# Patient Record
Sex: FEMALE | Race: WHITE | Employment: FULL TIME | URBAN - METROPOLITAN AREA
[De-identification: names, ages, dates, MRNs, and addresses within clinical notes are randomized per-mention and may not be internally consistent; named-entity substitution may affect disease eponyms.]

---

## 2017-01-27 LAB
ABO + RH BLD: NORMAL
ABO + RH BLD: NORMAL
BLD GP AB SCN SERPL QL: NORMAL
C TRACH DNA SPEC QL PROBE+SIG AMP: NORMAL
GROUP B STREP PCR: NORMAL
HBV SURFACE AG SERPL QL IA: NORMAL
HIV 1+2 AB+HIV1 P24 AG SERPL QL IA: NORMAL
N GONORRHOEA DNA SPEC QL PROBE+SIG AMP: NORMAL
RUBELLA ANTIBODY IGG QUANTITATIVE: NORMAL IU/ML
T PALLIDUM IGG SER QL: NORMAL

## 2017-08-17 LAB — GROUP B STREP PCR: NORMAL

## 2017-08-30 ENCOUNTER — HOSPITAL ENCOUNTER (INPATIENT)
Facility: CLINIC | Age: 32
LOS: 4 days | Discharge: HOME | End: 2017-09-03
Attending: OBSTETRICS & GYNECOLOGY | Admitting: OBSTETRICS & GYNECOLOGY
Payer: COMMERCIAL

## 2017-08-30 ENCOUNTER — APPOINTMENT (OUTPATIENT)
Dept: ULTRASOUND IMAGING | Facility: CLINIC | Age: 32
End: 2017-08-30
Attending: OBSTETRICS & GYNECOLOGY
Payer: COMMERCIAL

## 2017-08-30 DIAGNOSIS — Z98.891 S/P PRIMARY LOW TRANSVERSE C-SECTION: Primary | ICD-10-CM

## 2017-08-30 PROBLEM — Z34.90 PREGNANCY: Status: ACTIVE | Noted: 2017-08-30

## 2017-08-30 LAB
ABO + RH BLD: NORMAL
ABO + RH BLD: NORMAL
AMPHETAMINES UR QL SCN: NEGATIVE
BASOPHILS # BLD AUTO: 0 10E9/L (ref 0–0.2)
BASOPHILS NFR BLD AUTO: 0.3 %
BLOOD BANK CMNT PATIENT-IMP: NORMAL
BLOOD BANK CMNT PATIENT-IMP: NORMAL
CANNABINOIDS UR QL: NEGATIVE
COCAINE UR QL: NEGATIVE
DATE RH IMM GL GVN: NORMAL
DIFFERENTIAL METHOD BLD: ABNORMAL
EOSINOPHIL # BLD AUTO: 0.1 10E9/L (ref 0–0.7)
EOSINOPHIL NFR BLD AUTO: 1.2 %
ERYTHROCYTE [DISTWIDTH] IN BLOOD BY AUTOMATED COUNT: 13.3 % (ref 10–15)
FETAL CELL SCN BLD QL ROSETTE: NORMAL
FIBRINOGEN PPP-MCNC: 690 MG/DL (ref 200–420)
HCT VFR BLD AUTO: 37.8 % (ref 35–47)
HGB BLD-MCNC: 13.2 G/DL (ref 11.7–15.7)
HGB F BLD QL KLEIH BETKE: NORMAL
HGB F MFR BLD KLEIH BETKE: NORMAL %
IMM GRANULOCYTES # BLD: 0 10E9/L (ref 0–0.4)
IMM GRANULOCYTES NFR BLD: 0.3 %
LYMPHOCYTES # BLD AUTO: 2.3 10E9/L (ref 0.8–5.3)
LYMPHOCYTES NFR BLD AUTO: 19.3 %
MCH RBC QN AUTO: 32.1 PG (ref 26.5–33)
MCHC RBC AUTO-ENTMCNC: 34.9 G/DL (ref 31.5–36.5)
MCV RBC AUTO: 92 FL (ref 78–100)
MONOCYTES # BLD AUTO: 0.8 10E9/L (ref 0–1.3)
MONOCYTES NFR BLD AUTO: 6.5 %
NEUTROPHILS # BLD AUTO: 8.7 10E9/L (ref 1.6–8.3)
NEUTROPHILS NFR BLD AUTO: 72.4 %
NRBC # BLD AUTO: 0 10*3/UL
NRBC BLD AUTO-RTO: 0 /100
OPIATES UR QL SCN: NEGATIVE
PCP UR QL SCN: NEGATIVE
PLATELET # BLD AUTO: 239 10E9/L (ref 150–450)
RBC # BLD AUTO: 4.11 10E12/L (ref 3.8–5.2)
RH IG VIALS RECOM PATIENT: NORMAL
WBC # BLD AUTO: 12 10E9/L (ref 4–11)

## 2017-08-30 PROCEDURE — 3E033VJ INTRODUCTION OF OTHER HORMONE INTO PERIPHERAL VEIN, PERCUTANEOUS APPROACH: ICD-10-PCS | Performed by: OBSTETRICS & GYNECOLOGY

## 2017-08-30 PROCEDURE — 85461 HEMOGLOBIN FETAL: CPT | Performed by: OBSTETRICS & GYNECOLOGY

## 2017-08-30 PROCEDURE — 86901 BLOOD TYPING SEROLOGIC RH(D): CPT | Performed by: OBSTETRICS & GYNECOLOGY

## 2017-08-30 PROCEDURE — 12000029 ZZH R&B OB INTERMEDIATE

## 2017-08-30 PROCEDURE — 76815 OB US LIMITED FETUS(S): CPT

## 2017-08-30 PROCEDURE — 86900 BLOOD TYPING SEROLOGIC ABO: CPT | Performed by: OBSTETRICS & GYNECOLOGY

## 2017-08-30 PROCEDURE — 25000125 ZZHC RX 250: Performed by: OBSTETRICS & GYNECOLOGY

## 2017-08-30 PROCEDURE — 85384 FIBRINOGEN ACTIVITY: CPT | Performed by: OBSTETRICS & GYNECOLOGY

## 2017-08-30 PROCEDURE — 86850 RBC ANTIBODY SCREEN: CPT | Performed by: OBSTETRICS & GYNECOLOGY

## 2017-08-30 PROCEDURE — 80307 DRUG TEST PRSMV CHEM ANLYZR: CPT | Performed by: OBSTETRICS & GYNECOLOGY

## 2017-08-30 PROCEDURE — 86870 RBC ANTIBODY IDENTIFICATION: CPT | Performed by: OBSTETRICS & GYNECOLOGY

## 2017-08-30 PROCEDURE — 86923 COMPATIBILITY TEST ELECTRIC: CPT | Performed by: OBSTETRICS & GYNECOLOGY

## 2017-08-30 PROCEDURE — 36415 COLL VENOUS BLD VENIPUNCTURE: CPT | Performed by: OBSTETRICS & GYNECOLOGY

## 2017-08-30 PROCEDURE — 59025 FETAL NON-STRESS TEST: CPT

## 2017-08-30 PROCEDURE — 99215 OFFICE O/P EST HI 40 MIN: CPT | Mod: 25

## 2017-08-30 PROCEDURE — 85025 COMPLETE CBC W/AUTO DIFF WBC: CPT | Performed by: OBSTETRICS & GYNECOLOGY

## 2017-08-30 PROCEDURE — 86780 TREPONEMA PALLIDUM: CPT | Performed by: OBSTETRICS & GYNECOLOGY

## 2017-08-30 PROCEDURE — 96372 THER/PROPH/DIAG INJ SC/IM: CPT

## 2017-08-30 PROCEDURE — 85460 HEMOGLOBIN FETAL: CPT | Performed by: OBSTETRICS & GYNECOLOGY

## 2017-08-30 PROCEDURE — 25000128 H RX IP 250 OP 636: Performed by: OBSTETRICS & GYNECOLOGY

## 2017-08-30 RX ORDER — SODIUM CHLORIDE, SODIUM LACTATE, POTASSIUM CHLORIDE, CALCIUM CHLORIDE 600; 310; 30; 20 MG/100ML; MG/100ML; MG/100ML; MG/100ML
INJECTION, SOLUTION INTRAVENOUS CONTINUOUS
Status: DISCONTINUED | OUTPATIENT
Start: 2017-08-30 | End: 2017-08-31

## 2017-08-30 RX ORDER — ACETAMINOPHEN 325 MG/1
650 TABLET ORAL EVERY 4 HOURS PRN
Status: DISCONTINUED | OUTPATIENT
Start: 2017-08-30 | End: 2017-08-31

## 2017-08-30 RX ORDER — METHYLERGONOVINE MALEATE 0.2 MG/ML
200 INJECTION INTRAVENOUS
Status: DISCONTINUED | OUTPATIENT
Start: 2017-08-30 | End: 2017-08-31

## 2017-08-30 RX ORDER — IBUPROFEN 400 MG/1
800 TABLET, FILM COATED ORAL
Status: DISCONTINUED | OUTPATIENT
Start: 2017-08-30 | End: 2017-08-31

## 2017-08-30 RX ORDER — PRENATAL VIT/IRON FUM/FOLIC AC 27MG-0.8MG
1 TABLET ORAL DAILY
COMMUNITY

## 2017-08-30 RX ORDER — OXYTOCIN 10 [USP'U]/ML
10 INJECTION, SOLUTION INTRAMUSCULAR; INTRAVENOUS
Status: DISCONTINUED | OUTPATIENT
Start: 2017-08-30 | End: 2017-08-31

## 2017-08-30 RX ORDER — OXYTOCIN/0.9 % SODIUM CHLORIDE 30/500 ML
1-24 PLASTIC BAG, INJECTION (ML) INTRAVENOUS CONTINUOUS
Status: DISCONTINUED | OUTPATIENT
Start: 2017-08-30 | End: 2017-08-31

## 2017-08-30 RX ORDER — ONDANSETRON 2 MG/ML
4 INJECTION INTRAMUSCULAR; INTRAVENOUS EVERY 6 HOURS PRN
Status: DISCONTINUED | OUTPATIENT
Start: 2017-08-30 | End: 2017-08-31

## 2017-08-30 RX ORDER — LIDOCAINE 40 MG/G
CREAM TOPICAL
Status: DISCONTINUED | OUTPATIENT
Start: 2017-08-30 | End: 2017-08-31

## 2017-08-30 RX ORDER — NALOXONE HYDROCHLORIDE 0.4 MG/ML
.1-.4 INJECTION, SOLUTION INTRAMUSCULAR; INTRAVENOUS; SUBCUTANEOUS
Status: DISCONTINUED | OUTPATIENT
Start: 2017-08-30 | End: 2017-08-31

## 2017-08-30 RX ORDER — OXYCODONE AND ACETAMINOPHEN 5; 325 MG/1; MG/1
1 TABLET ORAL
Status: DISCONTINUED | OUTPATIENT
Start: 2017-08-30 | End: 2017-08-31

## 2017-08-30 RX ORDER — ONDANSETRON 2 MG/ML
4 INJECTION INTRAMUSCULAR; INTRAVENOUS EVERY 6 HOURS PRN
Status: DISCONTINUED | OUTPATIENT
Start: 2017-08-30 | End: 2017-08-30

## 2017-08-30 RX ORDER — OXYTOCIN/0.9 % SODIUM CHLORIDE 30/500 ML
100-340 PLASTIC BAG, INJECTION (ML) INTRAVENOUS CONTINUOUS PRN
Status: DISCONTINUED | OUTPATIENT
Start: 2017-08-30 | End: 2017-08-31

## 2017-08-30 RX ORDER — CARBOPROST TROMETHAMINE 250 UG/ML
250 INJECTION, SOLUTION INTRAMUSCULAR
Status: DISCONTINUED | OUTPATIENT
Start: 2017-08-30 | End: 2017-08-31

## 2017-08-30 RX ADMIN — SODIUM CHLORIDE, POTASSIUM CHLORIDE, SODIUM LACTATE AND CALCIUM CHLORIDE: 600; 310; 30; 20 INJECTION, SOLUTION INTRAVENOUS at 17:21

## 2017-08-30 RX ADMIN — HUMAN RHO(D) IMMUNE GLOBULIN 300 MCG: 300 INJECTION, SOLUTION INTRAMUSCULAR at 14:30

## 2017-08-30 RX ADMIN — OXYTOCIN-SODIUM CHLORIDE 0.9% IV SOLN 30 UNIT/500ML 3 MILLI-UNITS/MIN: 30-0.9/5 SOLUTION at 18:20

## 2017-08-30 RX ADMIN — OXYTOCIN-SODIUM CHLORIDE 0.9% IV SOLN 30 UNIT/500ML 2 MILLI-UNITS/MIN: 30-0.9/5 SOLUTION at 17:49

## 2017-08-30 RX ADMIN — OXYTOCIN-SODIUM CHLORIDE 0.9% IV SOLN 30 UNIT/500ML 4 MILLI-UNITS/MIN: 30-0.9/5 SOLUTION at 18:49

## 2017-08-30 NOTE — H&P
LABOR PROGRESS NOTE  Vitals were reviewed  Has bleeding. Ctx present    Cervical Exam /-2 bleeding EFW 7 1/2#  Intact-not ruptured  Uterine activity:frequency q 3 minutes  Fetal Status:Tier 1 (normal)  Labs- no evidence for anemia or abruption  U/S - wnl  Impression: Bleeding possible sinus bleed. Smoker at risk for abruption. Labs and u/s wnl  Plan: Anticipate   Labor induction with Pitocin

## 2017-08-30 NOTE — IP AVS SNAPSHOT
[address]    [place] [address]    Phone:  [phone]                                       After Visit Summary   8/30/2017    Marycruz Aj    MRN: [id number]           After Visit Summary Signature Page     I have received my discharge instructions, and my questions have been answered. I have discussed any challenges I see with this plan with the nurse or doctor.    ..........................................................................................................................................  Patient/Patient Representative Signature      ..........................................................................................................................................  Patient Representative Print Name and Relationship to Patient    ..................................................               ................................................  Date                                            Time    ..........................................................................................................................................  Reviewed by Signature/Title    ...................................................              ..............................................  Date                                                            Time

## 2017-08-30 NOTE — PLAN OF CARE
Pt presents to [facility] via wheelchair per Dr Yanick OSCAR. Pt is 38 weeks gestation, having some vaginal bleeding since last night. Limited US to check placenta and JOSSELIN ordered, CBC w diff, fibrinogen ordered, (pt is Rh Negative) Annabelle Gonzalez. Pt is lali every 2-6 minutes, unaware. Pt states she feels painless abdominal tightening occassionally.  FHTs 140, reactive. Baby is visibly active, has hiccups currently.

## 2017-08-30 NOTE — H&P
The patient's history and physical have been reviewed, and the patient was examined. There has been  interval change in condition documented in the progress note    Edison Herndon

## 2017-08-30 NOTE — IP AVS SNAPSHOT
MRN:[id number]                      After Visit Summary   2017    Marycruz Aj    MRN: [id number]           Thank you!     Thank you for choosing [facility] for your care. Our goal is always to provide you with excellent care. Hearing back from our patients is one way we can continue to improve our services. Please take a few minutes to complete the written survey that you may receive in the mail after you visit with us. Thank you!        Patient Information     Date Of Birth          1985        About your hospital stay     You were admitted on:  2017 You last received care in the:  [address]    You were discharged on:  September 3, 2017       Who to Call     For medical emergencies, please call 911.  For non-urgent questions about your medical care, please call your primary care provider or clinic, None  For questions related to your surgery, please call your surgery clinic        Attending Provider     Provider Specialty    Edison Herndon MD OB/Gyn    Keely Haque MD OB/Gyn       Primary Care Provider    None      After Care Instructions     Activity       Review discharge instructions            Diet       Resume previous diet            Discharge Instructions - Gestational diabetic patients       Gestational diabetic patients to follow up for fasting blood sugar and 2 hour 75gm glucose load at 6 weeks postpartum.            Discharge Instructions - Postpartum visit       Schedule postpartum visit with your provider and return to clinic in 6 weeks.                  Further instructions from your care team       Postop  Birth Instructions    Activity       Do not lift more than 10 pounds for 6 weeks after surgery.  Ask family and friends for help when you need it.    No driving until you have stopped taking your pain medications (usually two weeks after surgery).    No heavy exercise or activity for 6 weeks.  Don't do anything  that will put a strain on your surgery site.    Don't strain when using the toilet.  Your care team may prescribe a stool softener if you have problems with your bowel movements.     To care for your incision:       Keep the incision clean and dry.    Do not soak your incision in water. No swimming or hot tubs until it has fully healed. You may soak in the bathtub if the water level is below your incision.    Do not use peroxide, gel, cream, lotion, or ointment on your incision.    Adjust your clothes to avoid pressure on your surgery site (check the elastic in your underwear for example).     You may see a small amount of clear or pink drainage and this is normal.  Check with your health care provider:       If the drainage increases or has an odor.    If the incision reddens, you have swelling, or develop a rash.    If you have increased pain and the medicine we prescribed doesn't help.    If you have a fever above 100.4 F (38 C) with or without chills when placing thermometer under your tongue.   The area around your incision (surgery wound), will feel numb.  This is normal. The numbness should go away in less than a year.     Keep your hands clean:  Always wash your hands before touching your incision (surgery wound). This helps reduce your risk of infection. If your hands aren't dirty, you may use an alcohol hand-rub to clean your hands. Keep your nails clean and short.    Call your healthcare provider if you have any of these symptoms:       You soak a sanitary pad with blood within 1 hour, or you see blood clots larger than a golf ball.    Bleeding that lasts more than 6 weeks.    Vaginal discharge that smells bad.    Severe pain, cramping or tenderness in your lower belly area.    A need to urinate more frequently (use the toilet more often), more urgently (use the toilet very quickly), or it burns when you urinate.    Nausea and vomiting.    Redness, swelling or pain around a vein in your leg.    Problems  "breastfeeding or a red or painful area on your breast.    Chest pain and cough or are gasping for air.    Problems with coping with sadness, anxiety or depression. If you have concerns about hurting yourself or the baby, call your provider immediately.      You have questions or concerns after you return home.                    Pending Results     Date and Time Order Name Status Description    2017 1612 Placenta path order and indications In process             Admission Information     Date & Time Provider Department Dept. Phone    2017 Keely Haque MD [address] [phone]      Your Vitals Were     Blood Pressure Pulse Temperature Respirations Height Weight    115/72 70 98.2  F (36.8  C) (Oral) 16 1.626 m (5' 4\") 88.9 kg (196 lb)    Pulse Oximetry BMI (Body Mass Index)                100% 33.64 kg/m2          [employer] Information     [employer] lets you send messages to your doctor, view your test results, renew your prescriptions, schedule appointments and more. To sign up, go to [email or web address] . Click on \"Log in\" on the left side of the screen, which will take you to the Welcome page. Then click on \"Sign up Now\" on the right side of the page.     You will be asked to enter the access code listed below, as well as some personal information. Please follow the directions to create your username and password.     Your access code is: [email or web address]  Expires: 2017 10:27 AM     Your access code will  in 90 days. If you need help or a new code, please call your [place] clinic or [phone].        Care EveryWhere ID     This is your Care EveryWhere ID. This could be used by other organizations to access your [place] medical records  [id number]        Equal Access to Services     ROBERT ARMSTRONG : Pj Holloway, jennifer zamarripa, german gallardo. So [facility] [phone].    ATENCIÓN: Si habla " español, tiene a aguilar disposición servicios gratuitos de asistencia lingüística. Jennifer hawkins [phone].    We comply with applicable federal civil rights laws and [place] laws. We do not discriminate on the basis of race, color, national origin, age, disability sex, sexual orientation or gender identity.               Review of your medicines      START taking        Dose / Directions    oxyCODONE 5 MG IR tablet   Commonly known as:  ROXICODONE        Dose:  5-10 mg   Take 1-2 tablets (5-10 mg) by mouth every 3 hours as needed for moderate to severe pain   Quantity:  30 tablet   Refills:  0         CONTINUE these medicines which have NOT CHANGED        Dose / Directions    prenatal multivitamin plus iron 27-0.8 MG Tabs per tablet   Indication:  Pregnancy        Dose:  1 tablet   Take 1 tablet by mouth daily   Refills:  0            Where to get your medicines      Some of these will need a paper prescription and others can be bought over the counter. Ask your nurse if you have questions.     Bring a paper prescription for each of these medications     oxyCODONE 5 MG IR tablet                Protect others around you: Learn how to safely use, store and throw away your medicines at www.disposemymeds.org.             Medication List: This is a list of all your medications and when to take them. Check marks below indicate your daily home schedule. Keep this list as a reference.      Medications           Morning Afternoon Evening Bedtime As Needed    oxyCODONE 5 MG IR tablet   Commonly known as:  ROXICODONE   Take 1-2 tablets (5-10 mg) by mouth every 3 hours as needed for moderate to severe pain   Last time this was given:  5 mg on 9/3/2017 10:07 AM   Next Dose Due:  1:07pm                                  prenatal multivitamin plus iron 27-0.8 MG Tabs per tablet   Take 1 tablet by mouth daily

## 2017-08-31 ENCOUNTER — ANESTHESIA EVENT (OUTPATIENT)
Dept: OBGYN | Facility: CLINIC | Age: 32
End: 2017-08-31
Payer: COMMERCIAL

## 2017-08-31 ENCOUNTER — ANESTHESIA (OUTPATIENT)
Dept: OBGYN | Facility: CLINIC | Age: 32
End: 2017-08-31
Payer: COMMERCIAL

## 2017-08-31 PROBLEM — Z98.891 S/P PRIMARY LOW TRANSVERSE C-SECTION: Status: ACTIVE | Noted: 2017-08-31

## 2017-08-31 LAB
ABO + RH BLD: ABNORMAL
ABO + RH BLD: ABNORMAL
ABO + RH BLD: NORMAL
ABO + RH BLD: NORMAL
APTT PPP: 29 SEC (ref 22–37)
BLD GP AB INVEST PLASRBC-IMP: ABNORMAL
BLD GP AB SCN SERPL QL: ABNORMAL
BLD PROD TYP BPU: ABNORMAL
BLD PROD TYP BPU: NORMAL
BLD PROD TYP BPU: NORMAL
BLD UNIT ID BPU: 0
BLD UNIT ID BPU: 0
BLOOD BANK CMNT PATIENT-IMP: ABNORMAL
BLOOD BANK CMNT PATIENT-IMP: NORMAL
BLOOD PRODUCT CODE: NORMAL
BLOOD PRODUCT CODE: NORMAL
BPU ID: NORMAL
BPU ID: NORMAL
ERYTHROCYTE [DISTWIDTH] IN BLOOD BY AUTOMATED COUNT: 13.1 % (ref 10–15)
HCT VFR BLD AUTO: 36.4 % (ref 35–47)
HGB BLD-MCNC: 12.9 G/DL (ref 11.7–15.7)
INR PPP: 0.94 (ref 0.86–1.14)
MCH RBC QN AUTO: 32.5 PG (ref 26.5–33)
MCHC RBC AUTO-ENTMCNC: 35.4 G/DL (ref 31.5–36.5)
MCV RBC AUTO: 92 FL (ref 78–100)
NUM BPU REQUESTED: 2
PLATELET # BLD AUTO: 220 10E9/L (ref 150–450)
RBC # BLD AUTO: 3.97 10E12/L (ref 3.8–5.2)
SPECIMEN EXP DATE BLD: ABNORMAL
SPECIMEN EXP DATE BLD: NORMAL
T PALLIDUM IGG+IGM SER QL: NEGATIVE
TRANSFUSION STATUS PATIENT QL: NORMAL
WBC # BLD AUTO: 13.8 10E9/L (ref 4–11)

## 2017-08-31 PROCEDURE — 85610 PROTHROMBIN TIME: CPT | Performed by: OBSTETRICS & GYNECOLOGY

## 2017-08-31 PROCEDURE — 37000011 ZZH ANESTHESIA WARD SERVICE

## 2017-08-31 PROCEDURE — 71000014 ZZH RECOVERY PHASE 1 LEVEL 2 FIRST HR: Performed by: OBSTETRICS & GYNECOLOGY

## 2017-08-31 PROCEDURE — 88307 TISSUE EXAM BY PATHOLOGIST: CPT | Performed by: OBSTETRICS & GYNECOLOGY

## 2017-08-31 PROCEDURE — 85730 THROMBOPLASTIN TIME PARTIAL: CPT | Performed by: OBSTETRICS & GYNECOLOGY

## 2017-08-31 PROCEDURE — 71000015 ZZH RECOVERY PHASE 1 LEVEL 2 EA ADDTL HR: Performed by: OBSTETRICS & GYNECOLOGY

## 2017-08-31 PROCEDURE — 25000125 ZZHC RX 250: Performed by: OBSTETRICS & GYNECOLOGY

## 2017-08-31 PROCEDURE — 25000128 H RX IP 250 OP 636: Performed by: OBSTETRICS & GYNECOLOGY

## 2017-08-31 PROCEDURE — 36415 COLL VENOUS BLD VENIPUNCTURE: CPT | Performed by: OBSTETRICS & GYNECOLOGY

## 2017-08-31 PROCEDURE — 00HU33Z INSERTION OF INFUSION DEVICE INTO SPINAL CANAL, PERCUTANEOUS APPROACH: ICD-10-PCS | Performed by: ANESTHESIOLOGY

## 2017-08-31 PROCEDURE — 27210794 ZZH OR GENERAL SUPPLY STERILE: Performed by: OBSTETRICS & GYNECOLOGY

## 2017-08-31 PROCEDURE — 25000128 H RX IP 250 OP 636: Performed by: NURSE ANESTHETIST, CERTIFIED REGISTERED

## 2017-08-31 PROCEDURE — 3E0R3CZ INTRODUCTION OF REGIONAL ANESTHETIC INTO SPINAL CANAL, PERCUTANEOUS APPROACH: ICD-10-PCS | Performed by: ANESTHESIOLOGY

## 2017-08-31 PROCEDURE — 88307 TISSUE EXAM BY PATHOLOGIST: CPT | Mod: 26 | Performed by: OBSTETRICS & GYNECOLOGY

## 2017-08-31 PROCEDURE — 25000125 ZZHC RX 250: Performed by: NURSE ANESTHETIST, CERTIFIED REGISTERED

## 2017-08-31 PROCEDURE — 25000128 H RX IP 250 OP 636: Performed by: ANESTHESIOLOGY

## 2017-08-31 PROCEDURE — P9016 RBC LEUKOCYTES REDUCED: HCPCS | Performed by: OBSTETRICS & GYNECOLOGY

## 2017-08-31 PROCEDURE — 85027 COMPLETE CBC AUTOMATED: CPT | Performed by: OBSTETRICS & GYNECOLOGY

## 2017-08-31 PROCEDURE — 25000132 ZZH RX MED GY IP 250 OP 250 PS 637: Performed by: OBSTETRICS & GYNECOLOGY

## 2017-08-31 PROCEDURE — 12000037 ZZH R&B POSTPARTUM INTERMEDIATE

## 2017-08-31 PROCEDURE — 37000008 ZZH ANESTHESIA TECHNICAL FEE, 1ST 30 MIN: Performed by: OBSTETRICS & GYNECOLOGY

## 2017-08-31 PROCEDURE — 37000009 ZZH ANESTHESIA TECHNICAL FEE, EACH ADDTL 15 MIN: Performed by: OBSTETRICS & GYNECOLOGY

## 2017-08-31 PROCEDURE — 10907ZC DRAINAGE OF AMNIOTIC FLUID, THERAPEUTIC FROM PRODUCTS OF CONCEPTION, VIA NATURAL OR ARTIFICIAL OPENING: ICD-10-PCS | Performed by: OBSTETRICS & GYNECOLOGY

## 2017-08-31 PROCEDURE — 36000056 ZZH SURGERY LEVEL 3 1ST 30 MIN: Performed by: OBSTETRICS & GYNECOLOGY

## 2017-08-31 PROCEDURE — 36000058 ZZH SURGERY LEVEL 3 EA 15 ADDTL MIN: Performed by: OBSTETRICS & GYNECOLOGY

## 2017-08-31 RX ORDER — OXYTOCIN/0.9 % SODIUM CHLORIDE 30/500 ML
340 PLASTIC BAG, INJECTION (ML) INTRAVENOUS CONTINUOUS PRN
Status: DISCONTINUED | OUTPATIENT
Start: 2017-08-31 | End: 2017-09-03 | Stop reason: HOSPADM

## 2017-08-31 RX ORDER — ACETAMINOPHEN 325 MG/1
650 TABLET ORAL EVERY 4 HOURS PRN
Status: DISCONTINUED | OUTPATIENT
Start: 2017-09-03 | End: 2017-09-03 | Stop reason: HOSPADM

## 2017-08-31 RX ORDER — ONDANSETRON 4 MG/1
4 TABLET, ORALLY DISINTEGRATING ORAL EVERY 30 MIN PRN
Status: DISCONTINUED | OUTPATIENT
Start: 2017-08-31 | End: 2017-08-31

## 2017-08-31 RX ORDER — ROPIVACAINE HYDROCHLORIDE 2 MG/ML
10 INJECTION, SOLUTION EPIDURAL; INFILTRATION; PERINEURAL ONCE
Status: COMPLETED | OUTPATIENT
Start: 2017-08-31 | End: 2017-08-31

## 2017-08-31 RX ORDER — ONDANSETRON 2 MG/ML
4 INJECTION INTRAMUSCULAR; INTRAVENOUS EVERY 30 MIN PRN
Status: DISCONTINUED | OUTPATIENT
Start: 2017-08-31 | End: 2017-08-31

## 2017-08-31 RX ORDER — DIPHENHYDRAMINE HCL 25 MG
25 CAPSULE ORAL EVERY 6 HOURS PRN
Status: DISCONTINUED | OUTPATIENT
Start: 2017-08-31 | End: 2017-09-03 | Stop reason: HOSPADM

## 2017-08-31 RX ORDER — FENTANYL CITRATE 50 UG/ML
25-50 INJECTION, SOLUTION INTRAMUSCULAR; INTRAVENOUS
Status: DISCONTINUED | OUTPATIENT
Start: 2017-08-31 | End: 2017-08-31

## 2017-08-31 RX ORDER — OXYCODONE HYDROCHLORIDE 5 MG/1
5-10 TABLET ORAL
Status: DISCONTINUED | OUTPATIENT
Start: 2017-08-31 | End: 2017-09-03 | Stop reason: HOSPADM

## 2017-08-31 RX ORDER — NALBUPHINE HYDROCHLORIDE 10 MG/ML
2.5-5 INJECTION, SOLUTION INTRAMUSCULAR; INTRAVENOUS; SUBCUTANEOUS EVERY 6 HOURS PRN
Status: DISCONTINUED | OUTPATIENT
Start: 2017-08-31 | End: 2017-08-31

## 2017-08-31 RX ORDER — HYDROCORTISONE 2.5 %
CREAM (GRAM) TOPICAL 3 TIMES DAILY PRN
Status: DISCONTINUED | OUTPATIENT
Start: 2017-08-31 | End: 2017-09-03 | Stop reason: HOSPADM

## 2017-08-31 RX ORDER — LIDOCAINE 40 MG/G
CREAM TOPICAL
Status: DISCONTINUED | OUTPATIENT
Start: 2017-08-31 | End: 2017-09-03 | Stop reason: HOSPADM

## 2017-08-31 RX ORDER — NALOXONE HYDROCHLORIDE 0.4 MG/ML
.1-.4 INJECTION, SOLUTION INTRAMUSCULAR; INTRAVENOUS; SUBCUTANEOUS
Status: ACTIVE | OUTPATIENT
Start: 2017-08-31 | End: 2017-09-01

## 2017-08-31 RX ORDER — FENTANYL CITRATE 50 UG/ML
100 INJECTION, SOLUTION INTRAMUSCULAR; INTRAVENOUS ONCE
Status: COMPLETED | OUTPATIENT
Start: 2017-08-31 | End: 2017-08-31

## 2017-08-31 RX ORDER — ALBUTEROL SULFATE 0.83 MG/ML
2.5 SOLUTION RESPIRATORY (INHALATION) EVERY 4 HOURS PRN
Status: DISCONTINUED | OUTPATIENT
Start: 2017-08-31 | End: 2017-08-31 | Stop reason: HOSPADM

## 2017-08-31 RX ORDER — KETOROLAC TROMETHAMINE 30 MG/ML
INJECTION, SOLUTION INTRAMUSCULAR; INTRAVENOUS
Status: DISCONTINUED
Start: 2017-08-31 | End: 2017-08-31 | Stop reason: HOSPADM

## 2017-08-31 RX ORDER — CITRIC ACID/SODIUM CITRATE 334-500MG
SOLUTION, ORAL ORAL
Status: DISCONTINUED
Start: 2017-08-31 | End: 2017-08-31 | Stop reason: HOSPADM

## 2017-08-31 RX ORDER — DIPHENHYDRAMINE HYDROCHLORIDE 50 MG/ML
25 INJECTION INTRAMUSCULAR; INTRAVENOUS EVERY 6 HOURS PRN
Status: DISCONTINUED | OUTPATIENT
Start: 2017-08-31 | End: 2017-09-03 | Stop reason: HOSPADM

## 2017-08-31 RX ORDER — ONDANSETRON 2 MG/ML
4 INJECTION INTRAMUSCULAR; INTRAVENOUS EVERY 6 HOURS PRN
Status: DISCONTINUED | OUTPATIENT
Start: 2017-08-31 | End: 2017-08-31

## 2017-08-31 RX ORDER — OXYTOCIN/0.9 % SODIUM CHLORIDE 30/500 ML
100 PLASTIC BAG, INJECTION (ML) INTRAVENOUS CONTINUOUS
Status: DISCONTINUED | OUTPATIENT
Start: 2017-08-31 | End: 2017-09-03 | Stop reason: HOSPADM

## 2017-08-31 RX ORDER — MEPERIDINE HYDROCHLORIDE 25 MG/ML
12.5 INJECTION INTRAMUSCULAR; INTRAVENOUS; SUBCUTANEOUS
Status: DISCONTINUED | OUTPATIENT
Start: 2017-08-31 | End: 2017-08-31

## 2017-08-31 RX ORDER — NALOXONE HYDROCHLORIDE 0.4 MG/ML
.1-.4 INJECTION, SOLUTION INTRAMUSCULAR; INTRAVENOUS; SUBCUTANEOUS
Status: DISCONTINUED | OUTPATIENT
Start: 2017-08-31 | End: 2017-09-03 | Stop reason: HOSPADM

## 2017-08-31 RX ORDER — DEXTROSE, SODIUM CHLORIDE, SODIUM LACTATE, POTASSIUM CHLORIDE, AND CALCIUM CHLORIDE 5; .6; .31; .03; .02 G/100ML; G/100ML; G/100ML; G/100ML; G/100ML
INJECTION, SOLUTION INTRAVENOUS CONTINUOUS
Status: DISCONTINUED | OUTPATIENT
Start: 2017-08-31 | End: 2017-09-03 | Stop reason: HOSPADM

## 2017-08-31 RX ORDER — CEFAZOLIN SODIUM 1 G/3ML
INJECTION, POWDER, FOR SOLUTION INTRAMUSCULAR; INTRAVENOUS PRN
Status: DISCONTINUED | OUTPATIENT
Start: 2017-08-31 | End: 2017-08-31

## 2017-08-31 RX ORDER — FENTANYL CITRATE 50 UG/ML
25-50 INJECTION, SOLUTION INTRAMUSCULAR; INTRAVENOUS
Status: DISCONTINUED | OUTPATIENT
Start: 2017-08-31 | End: 2017-08-31 | Stop reason: HOSPADM

## 2017-08-31 RX ORDER — ACETAMINOPHEN 325 MG/1
975 TABLET ORAL EVERY 8 HOURS
Status: DISCONTINUED | OUTPATIENT
Start: 2017-08-31 | End: 2017-09-03 | Stop reason: HOSPADM

## 2017-08-31 RX ORDER — BISACODYL 10 MG
10 SUPPOSITORY, RECTAL RECTAL DAILY PRN
Status: DISCONTINUED | OUTPATIENT
Start: 2017-09-02 | End: 2017-09-03 | Stop reason: HOSPADM

## 2017-08-31 RX ORDER — MORPHINE SULFATE 1 MG/ML
INJECTION, SOLUTION EPIDURAL; INTRATHECAL; INTRAVENOUS PRN
Status: DISCONTINUED | OUTPATIENT
Start: 2017-08-31 | End: 2017-08-31

## 2017-08-31 RX ORDER — NALOXONE HYDROCHLORIDE 0.4 MG/ML
.1-.4 INJECTION, SOLUTION INTRAMUSCULAR; INTRAVENOUS; SUBCUTANEOUS
Status: DISCONTINUED | OUTPATIENT
Start: 2017-08-31 | End: 2017-08-31

## 2017-08-31 RX ORDER — OXYTOCIN 10 [USP'U]/ML
10 INJECTION, SOLUTION INTRAMUSCULAR; INTRAVENOUS
Status: DISCONTINUED | OUTPATIENT
Start: 2017-08-31 | End: 2017-09-03 | Stop reason: HOSPADM

## 2017-08-31 RX ORDER — EPHEDRINE SULFATE 50 MG/ML
5 INJECTION, SOLUTION INTRAMUSCULAR; INTRAVENOUS; SUBCUTANEOUS
Status: DISCONTINUED | OUTPATIENT
Start: 2017-08-31 | End: 2017-08-31

## 2017-08-31 RX ORDER — HYDRALAZINE HYDROCHLORIDE 20 MG/ML
2.5-5 INJECTION INTRAMUSCULAR; INTRAVENOUS EVERY 10 MIN PRN
Status: DISCONTINUED | OUTPATIENT
Start: 2017-08-31 | End: 2017-08-31 | Stop reason: HOSPADM

## 2017-08-31 RX ORDER — ONDANSETRON 4 MG/1
4 TABLET, ORALLY DISINTEGRATING ORAL EVERY 6 HOURS PRN
Status: DISCONTINUED | OUTPATIENT
Start: 2017-08-31 | End: 2017-08-31

## 2017-08-31 RX ORDER — KETOROLAC TROMETHAMINE 30 MG/ML
30 INJECTION, SOLUTION INTRAMUSCULAR; INTRAVENOUS EVERY 6 HOURS
Status: COMPLETED | OUTPATIENT
Start: 2017-08-31 | End: 2017-09-01

## 2017-08-31 RX ORDER — MISOPROSTOL 200 UG/1
400 TABLET ORAL
Status: DISCONTINUED | OUTPATIENT
Start: 2017-08-31 | End: 2017-09-03 | Stop reason: HOSPADM

## 2017-08-31 RX ORDER — SIMETHICONE 80 MG
80 TABLET,CHEWABLE ORAL 4 TIMES DAILY PRN
Status: DISCONTINUED | OUTPATIENT
Start: 2017-08-31 | End: 2017-09-03 | Stop reason: HOSPADM

## 2017-08-31 RX ORDER — SODIUM CHLORIDE, SODIUM LACTATE, POTASSIUM CHLORIDE, CALCIUM CHLORIDE 600; 310; 30; 20 MG/100ML; MG/100ML; MG/100ML; MG/100ML
INJECTION, SOLUTION INTRAVENOUS CONTINUOUS PRN
Status: DISCONTINUED | OUTPATIENT
Start: 2017-08-31 | End: 2017-08-31

## 2017-08-31 RX ORDER — ONDANSETRON 2 MG/ML
INJECTION INTRAMUSCULAR; INTRAVENOUS PRN
Status: DISCONTINUED | OUTPATIENT
Start: 2017-08-31 | End: 2017-08-31

## 2017-08-31 RX ORDER — SODIUM CHLORIDE, SODIUM LACTATE, POTASSIUM CHLORIDE, CALCIUM CHLORIDE 600; 310; 30; 20 MG/100ML; MG/100ML; MG/100ML; MG/100ML
INJECTION, SOLUTION INTRAVENOUS CONTINUOUS
Status: DISCONTINUED | OUTPATIENT
Start: 2017-08-31 | End: 2017-09-03 | Stop reason: HOSPADM

## 2017-08-31 RX ORDER — AMOXICILLIN 250 MG
1-2 CAPSULE ORAL 2 TIMES DAILY
Status: DISCONTINUED | OUTPATIENT
Start: 2017-08-31 | End: 2017-09-03 | Stop reason: HOSPADM

## 2017-08-31 RX ORDER — ONDANSETRON 2 MG/ML
4 INJECTION INTRAMUSCULAR; INTRAVENOUS EVERY 6 HOURS PRN
Status: DISCONTINUED | OUTPATIENT
Start: 2017-08-31 | End: 2017-09-03 | Stop reason: HOSPADM

## 2017-08-31 RX ORDER — LANOLIN 100 %
OINTMENT (GRAM) TOPICAL
Status: DISCONTINUED | OUTPATIENT
Start: 2017-08-31 | End: 2017-09-03 | Stop reason: HOSPADM

## 2017-08-31 RX ORDER — LIDOCAINE HCL/EPINEPHRINE/PF 2%-1:200K
VIAL (ML) INJECTION
Status: DISCONTINUED
Start: 2017-08-31 | End: 2017-08-31 | Stop reason: HOSPADM

## 2017-08-31 RX ORDER — MORPHINE SULFATE 1 MG/ML
INJECTION, SOLUTION EPIDURAL; INTRATHECAL; INTRAVENOUS
Status: DISCONTINUED
Start: 2017-08-31 | End: 2017-08-31 | Stop reason: HOSPADM

## 2017-08-31 RX ORDER — HYDROMORPHONE HYDROCHLORIDE 1 MG/ML
.3-.5 INJECTION, SOLUTION INTRAMUSCULAR; INTRAVENOUS; SUBCUTANEOUS EVERY 10 MIN PRN
Status: DISCONTINUED | OUTPATIENT
Start: 2017-08-31 | End: 2017-08-31

## 2017-08-31 RX ADMIN — PHENYLEPHRINE HYDROCHLORIDE 100 MCG: 10 INJECTION, SOLUTION INTRAMUSCULAR; INTRAVENOUS; SUBCUTANEOUS at 15:22

## 2017-08-31 RX ADMIN — MORPHINE SULFATE 3 MG: 1 INJECTION EPIDURAL; INTRATHECAL; INTRAVENOUS at 15:27

## 2017-08-31 RX ADMIN — DIPHENHYDRAMINE HYDROCHLORIDE 25 MG: 25 CAPSULE ORAL at 19:18

## 2017-08-31 RX ADMIN — OXYTOCIN-SODIUM CHLORIDE 0.9% IV SOLN 30 UNIT/500ML 340 ML/HR: 30-0.9/5 SOLUTION at 15:24

## 2017-08-31 RX ADMIN — PHENYLEPHRINE HYDROCHLORIDE 100 MCG: 10 INJECTION, SOLUTION INTRAMUSCULAR; INTRAVENOUS; SUBCUTANEOUS at 15:18

## 2017-08-31 RX ADMIN — SODIUM CHLORIDE, POTASSIUM CHLORIDE, SODIUM LACTATE AND CALCIUM CHLORIDE: 600; 310; 30; 20 INJECTION, SOLUTION INTRAVENOUS at 15:18

## 2017-08-31 RX ADMIN — CEFAZOLIN 2 G: 1 INJECTION, POWDER, FOR SOLUTION INTRAMUSCULAR; INTRAVENOUS at 15:19

## 2017-08-31 RX ADMIN — FENTANYL CITRATE 100 MCG: 50 INJECTION, SOLUTION INTRAMUSCULAR; INTRAVENOUS at 09:46

## 2017-08-31 RX ADMIN — Medication 12 ML/HR: at 10:00

## 2017-08-31 RX ADMIN — PHENYLEPHRINE HYDROCHLORIDE 100 MCG: 10 INJECTION, SOLUTION INTRAMUSCULAR; INTRAVENOUS; SUBCUTANEOUS at 15:21

## 2017-08-31 RX ADMIN — PHENYLEPHRINE HYDROCHLORIDE 100 MCG: 10 INJECTION, SOLUTION INTRAMUSCULAR; INTRAVENOUS; SUBCUTANEOUS at 15:12

## 2017-08-31 RX ADMIN — KETOROLAC TROMETHAMINE 30 MG: 30 INJECTION, SOLUTION INTRAMUSCULAR at 22:32

## 2017-08-31 RX ADMIN — SODIUM CHLORIDE, POTASSIUM CHLORIDE, SODIUM LACTATE AND CALCIUM CHLORIDE: 600; 310; 30; 20 INJECTION, SOLUTION INTRAVENOUS at 10:00

## 2017-08-31 RX ADMIN — OXYTOCIN-SODIUM CHLORIDE 0.9% IV SOLN 30 UNIT/500ML 100 ML/HR: 30-0.9/5 SOLUTION at 19:54

## 2017-08-31 RX ADMIN — PHENYLEPHRINE HYDROCHLORIDE 100 MCG: 10 INJECTION, SOLUTION INTRAMUSCULAR; INTRAVENOUS; SUBCUTANEOUS at 15:15

## 2017-08-31 RX ADMIN — SODIUM CHLORIDE, POTASSIUM CHLORIDE, SODIUM LACTATE AND CALCIUM CHLORIDE: 600; 310; 30; 20 INJECTION, SOLUTION INTRAVENOUS at 15:09

## 2017-08-31 RX ADMIN — SENNOSIDES AND DOCUSATE SODIUM 1 TABLET: 8.6; 5 TABLET ORAL at 19:50

## 2017-08-31 RX ADMIN — ONDANSETRON 4 MG: 2 INJECTION INTRAMUSCULAR; INTRAVENOUS at 15:12

## 2017-08-31 RX ADMIN — PHENYLEPHRINE HYDROCHLORIDE 100 MCG: 10 INJECTION, SOLUTION INTRAMUSCULAR; INTRAVENOUS; SUBCUTANEOUS at 15:17

## 2017-08-31 RX ADMIN — ROPIVACAINE HYDROCHLORIDE 10 ML: 2 INJECTION, SOLUTION EPIDURAL; INFILTRATION at 09:46

## 2017-08-31 RX ADMIN — KETOROLAC TROMETHAMINE 30 MG: 30 INJECTION, SOLUTION INTRAMUSCULAR at 16:40

## 2017-08-31 NOTE — H&P
DATE OF SERVICE:  2017       HISTORY OF PRESENT ILLNESS:  Marycruz Aj is a 32-year-old G4, P2-0-1-2 at 38-1/7 weeks.  She was admitted last evening with suspected possible peripheral abruption.  She was started on Pitocin and fetal heart rate tracing looked great through the evening.  This morning she underwent assisted rupture may of membranes with sarath bloody fluid, however, baseline was 130-140, moderate baseline variability, positive accelerations, no decelerations and she is lali every 1-1/2 to 2 minutes.  She continued to contract and there was occasional bloody fluid.  There was then 1 large clot passed and there was minimal fluid after that.  She remained at 2.5 cm around noon.  At that point, an IUPC was placed to determine labor adequacy.  Upon placement of IUPC, there was another large gush of blood tinged amniotic fluid.  Monitoring was continued to be close and she progressed to 4 cm and was paper thin.  At that point it was noted that the uterine tone baseline elevated and the fetal baseline decelerated down to a baseline of 110 with an undulating pattern.  I was called to assess at that point and there is concern for now symptomatic abruption, the fact that she has no further cervical dilation and we were remote from delivery, we will proceed with a .  This was discussed with the patient and her spouse, and they did agree.      PAST MEDICAL HISTORY:  Negative.      PAST SURGICAL HISTORY:  Significant for eye surgery and vaginal delivery.        MEDICATION ALLERGIES:  None.        CURRENT MEDICATIONS:  Prenatal vitamin and vitamin D.        FAMILY HISTORY:  Significant for lung cancer and diabetes.        PAST GYNECOLOGICAL HISTORY:  Significant for 2 prior vaginal deliveries at term and 1 prior first trimester miscarriage.        REVIEW OF SYSTEMS:  Significant for sarath vaginal bleeding.        PHYSICAL EXAMINATION:   CARDIOVASCULAR:  Regular rate and rhythm.   CHEST:  Clear to  auscultation bilaterally.   VITAL SIGNS:  Show blood pressure is 78/48, temperature is 98.3.  Fetal heart rate tracing is baseline of 110 with moderate baseline variability, undulating and contractions every 1-1/2 to 2 minutes.      ASSESSMENT AND PLAN:  A 32-year-old G4, P2-0-1-2 at 38 and 1/7 weeks now proceeding for  for likely symptomatic abruption.  We will have the NICU present.  We will proceed with a  section.  Risks, benefits and alternatives were discussed.  We have 2 units crossed and typed and these will be available for transfusion if needed.         KIMBERLY BAJWA MD             D: 2017 15:09   T: 2017 16:03   MT: NICO      Name:     SARAH MEDEIROS   MRN:      [id number]        Account:      [id number]   :      1985           Admitted:     [id number]      Document: [id number]

## 2017-08-31 NOTE — ANESTHESIA PROCEDURE NOTES
Peripheral nerve/Neuraxial procedure note : epidural catheter  Pre-Procedure  Performed by WILIAM DONATO  Location: OB      Pre-Anesthestic Checklist: patient identified, IV checked, risks and benefits discussed, informed consent, monitors and equipment checked, pre-op evaluation and at physician/surgeon's request    Timeout  Correct Patient: Yes   Correct Procedure: Yes   Correct Site: Yes   Correct Laterality: N/A   Correct Position: Yes   Site Marked: N/A   .   Procedure Documentation    .    Procedure:    Epidural catheter.  Insertion Site:L3-4  (midline approach) Injection technique: LORT saline   Local skin infiltrated with 3 mL of 1% lidocaine.  ADEOLA at 5 cm     Patient Prep;mask, sterile gloves, povidone-iodine 7.5% surgical scrub, patient draped.  .  Needle: ToNaldoy needle Needle Gauge: 17.    Needle Length (Inches) 3.5  # of attempts: 1 and # of redirects: : 0. .   Catheter: 19 G . .  Catheter threaded easily  4 cm epidural space.  9 cm at skin.   .    Assessment/Narrative  Paresthesias: No.  .  .  Aspiration negative for heme or CSF  . Test dose of 3 mL lidocaine 1.5% w/ 1:200,000 epinephrine at. Test dose negative for signs of intravascular, subdural or intrathecal injection. Comments:  Pt tolerated well.   Immediately to supine with TRANG.   FHTs stable post-procedure.   No complications.

## 2017-08-31 NOTE — PROGRESS NOTES
1700- bedside report received from Vonda Rai RN.  Care taken over.  Monitors applied.  Admission data base and POC reviewed with pt  1715- IV started without difficulty.  1720-Snack of toast and water per pt request.  1750- Pitocin started per order.  Support given.    1850 - pt states contractions starting to be more frequent but not uncomfortable or need to breath through UC.  1915- bedside report to Jennifer Miller RN and Keith Cortez RN. Care taken over.

## 2017-08-31 NOTE — ANESTHESIA CARE TRANSFER NOTE
Patient: Marycruz Aj    Procedure(s):   - Wound Class: II-Clean Contaminated    Diagnosis: pregnancy  Diagnosis Additional Information: No value filed.    Anesthesia Type:   No value filed.     Note:  Airway :Nasal Cannula  Patient transferred to:PACU        Vitals: (Last set prior to Anesthesia Care Transfer)    CRNA VITALS  8/31/2017 1521 - 8/31/2017 1557      8/31/2017             Resp Rate (set): 10                Electronically Signed By: DESHAUN Yusuf CRNA  August 31, 2017  3:57 PM

## 2017-08-31 NOTE — PROGRESS NOTES
"Labor Progress Note:    S: pt as admitted last night with vaginal bleeding. She was kept and pitocin was started for induction.     O:  /62  Pulse 77  Temp 98.2  F (36.8  C)  Resp 16  Ht 1.626 m (5' 4\")  Wt 88.9 kg (196 lb)  BMI 33.64 kg/m2  SVE: 80/-1 AROM with sarath bloody fluid  TOCO: Q1.5-2 minutes  FHR: 130's baseline. Moderate baseline. Accelerations and no decelerations.     A/P: 31 yo  at 38+17 wks with likely small abruption.   1. Will attempt vaginal delivery. Will get labs and have pt prepped for possible c/s. Willi also crossmatch for possible abruption either way.   2. Pain management as desired.   3. RH negative. GBS negative.     Keely Haque    "

## 2017-08-31 NOTE — BRIEF OP NOTE
[facility]  Gynecology Brief Operative Note    Pre-operative diagnosis: Pregnancy  Suspected abruption  Foreboding fetal tracing   Post-operative diagnosis Same   Procedure: Procedure(s):   SECTION   Surgeon: Keely Haque MD   Assistants(s): None   Anesthesia: Epidural    Estimated blood loss: 700 cc    Specimens: Placenta, cord blood and cord gas   Findings: Male in SARWAT presentation. No nuchal cord. Normal tubes and ovaries. Suspected peripheral abruption with black clot on edge of placenta and in uterine cavity   Complications: None   Comments: See dictated operative report for full details       Keely Haque

## 2017-08-31 NOTE — ANESTHESIA PREPROCEDURE EVALUATION
Anesthesia Evaluation     .             ROS/MED HX    ENT/Pulmonary:      (-) sleep apnea   Neurologic:       Cardiovascular:         METS/Exercise Tolerance:     Hematologic:         Musculoskeletal:         GI/Hepatic:        (-) GERD   Renal/Genitourinary:         Endo:         Psychiatric:         Infectious Disease:         Malignancy:         Other: Comment: Possible uterine rupture                    Physical Exam  Normal systems: cardiovascular, pulmonary and dental    Airway   Mallampati: II  TM distance: >3 FB  Neck ROM: full    Dental     Cardiovascular   Rhythm and rate: regular and normal      Pulmonary    breath sounds clear to auscultation                    Anesthesia Plan      History & Physical Review  History and physical reviewed and following examination; no interval change.    ASA Status:  2 emergent.    NPO Status:  > 8 hours    Plan for Epidural   PONV prophylaxis:  Ondansetron (or other 5HT-3)       Postoperative Care  Postoperative pain management:  Multi-modal analgesia.      Consents  Anesthetic plan, risks, benefits and alternatives discussed with:  Patient..                        Procedure: Procedure(s):   SECTION  Preop diagnosis: pregnancy    No Known Allergies  Past Medical History:   Diagnosis Date     Abnormal Pap smear of cervix      Chronic kidney disease     History of UTI     High risk HPV infection      Overweight(278.02)      Tobacco use disorder      Past Surgical History:   Procedure Laterality Date     Dilatation & Curettage       DILATION AND CURETTAGE, HYSTEROSCOPY DIAGNOSTIC, COMBINED  2013    Procedure: COMBINED DILATION AND CURETTAGE, HYSTEROSCOPY DIAGNOSTIC;  HYSTEROSCOPY, DILATION, CURETTAGE ;  Surgeon: Trinity Sosa MD;  Location:  [facility]     KERATOPLASTY PENETRATING, VITRECTOMY ANTERIOR, EXTRACAPSULAR CATARACT EXTRACTION, IOL IMP, COMBINED       KERATOPLASTY PENETRATING, VITRECTOMY ANTERIOR, EXTRACAPSULAR CATARACT EXTRACTION, IOL  IMP, COMBINED  2005     Prior to Admission medications    Medication Sig Start Date End Date Taking? Authorizing Provider   Prenatal Vit-Fe Fumarate-FA (PRENATAL MULTIVITAMIN PLUS IRON) 27-0.8 MG TABS per tablet Take 1 tablet by mouth daily   Yes Reported, Patient     Current Facility-Administered Medications Ordered in Epic   Medication Dose Route Frequency Last Rate Last Dose     medication instruction   Does not apply Continuous PRN         lactated ringers BOLUS 250 mL  250 mL Intravenous Once PRN         ePHEDrine injection 5 mg  5 mg Intravenous Q3 Min PRN         nalbuphine (NUBAIN) injection 2.5-5 mg  2.5-5 mg Intravenous Q6H PRN         naloxone (NARCAN) injection 0.1-0.4 mg  0.1-0.4 mg Intravenous Q2 Min PRN         medication instruction   Does not apply Continuous PRN         Opioid plan postpartum - medication instruction   Does not apply Continuous PRN         fentaNYL (SUBLIMAZE) 2 mcg/mL, ropivacaine (NAROPIN) 0.2% in NaCl 0.9% EPIDURAL infusion   EPIDURAL Continuous 12 mL/hr at 08/31/17 1000 12 mL/hr at 08/31/17 1000     ondansetron (ZOFRAN-ODT) ODT tab 4 mg  4 mg Oral Q6H PRN        Or     ondansetron (ZOFRAN) injection 4 mg  4 mg Intravenous Q6H PRN         morphine (PF) (ASTRAMORPH /DURAMORPH) 1 mg/mL (PF) injection             lidocaine 1 % 1 mL  1 mL Other Q1H PRN         lidocaine (LMX4) cream   Topical Q1H PRN         sodium chloride (PF) 0.9% PF flush 3 mL  3 mL Intracatheter Q1H PRN         sodium chloride (PF) 0.9% PF flush 3 mL  3 mL Intracatheter Q8H         oxytocin (PITOCIN) 30 units in 500 mL 0.9% NaCl infusion  100 mL/hr Intravenous Continuous         dextrose 5% in lactated ringers infusion   Intravenous Continuous         naloxone (NARCAN) injection 0.1-0.4 mg  0.1-0.4 mg Intravenous Q2 Min PRN         senna-docusate (SENOKOT-S;PERICOLACE) 8.6-50 MG per tablet 1-2 tablet  1-2 tablet Oral BID         [START ON 9/2/2017] bisacodyl (DULCOLAX) Suppository 10 mg  10 mg Rectal Daily PRN          [START ON 9/2/2017] sodium phosphate (FLEET ENEMA) 1 enema  1 enema Rectal Daily PRN         ondansetron (ZOFRAN) injection 4 mg  4 mg Intravenous Q6H PRN         hydrocortisone 2.5 % cream   Rectal TID PRN         diphenhydrAMINE (BENADRYL) capsule 25 mg  25 mg Oral Q6H PRN        Or     diphenhydrAMINE (BENADRYL) injection 25 mg  25 mg Intravenous Q6H PRN         lanolin ointment   Topical Q1H PRN         simethicone (MYLICON) chewable tablet 80 mg  80 mg Oral 4x Daily PRN         lactated ringers BOLUS 1,000 mL  1,000 mL Intravenous Once PRN         oxytocin (PITOCIN) 30 units in 500 mL 0.9% NaCl infusion  340 mL/hr Intravenous Continuous PRN         oxytocin (PITOCIN) injection 10 Units  10 Units Intramuscular Once PRN         misoprostol (CYTOTEC) tablet 400 mcg  400 mcg Oral Once PRN         Blood Bank will determine if patient is eligible for and the proper dosage of rho (D) immune globulin   Does not apply Continuous PRN         acetaminophen (TYLENOL) tablet 975 mg  975 mg Oral Q8H         [START ON 9/3/2017] acetaminophen (TYLENOL) tablet 650 mg  650 mg Oral Q4H PRN         oxyCODONE (ROXICODONE) IR tablet 5-10 mg  5-10 mg Oral Q3H PRN         ketorolac (TORADOL) injection 30 mg  30 mg Intravenous Q6H         No MMR Needed -  Assessment: Patient does not need MMR vaccine   Does not apply Continuous PRN         No Tdap Needed - Assessment: Patient does not need Tdap vaccine   Does not apply Continuous PRN         ondansetron (ZOFRAN) injection    PRN   4 mg at 08/02/13 1147     No current [facility]-ordered outpatient prescriptions on file.     Wt Readings from Last 1 Encounters:   08/30/17 88.9 kg (196 lb)     Temp Readings from Last 1 Encounters:   08/31/17 36.8  C (98.3  F) (Oral)     BP Readings from Last 6 Encounters:   08/31/17 (!) 78/48   08/02/13 92/64     Pulse Readings from Last 4 Encounters:   08/30/17 77     Resp Readings from Last 1 Encounters:   08/31/17 16     SpO2 Readings from Last 1  Encounters:   08/31/17 100%     Recent Labs   Lab Test  08/08/09   1625   NA  138   POTASSIUM  3.9   CHLORIDE  104   CO2  26   ANIONGAP  8   GLC  88   BUN  14   CR  0.77   MICHELINE  9.4     No results for input(s): AST, ALT in the last 00155 hours.    Invalid input(s): ALP, BILT, LPSE  Recent Labs   Lab Test  08/31/17   0800  08/30/17   1135   WBC  13.8*  12.0*   HGB  12.9  13.2   PLT  220  239     Recent Labs   Lab Test  08/31/17   0800   INR  0.94      No results for input(s): TROPI in the last 88517 hours.  RECENT LABS:   ECG:   ECHO:   CXR:

## 2017-08-31 NOTE — PROGRESS NOTES
Dr. DIYA Haque here to see pt. AROM revealed bright red bleeding. Options for delivery were discussed with pt and her . At this time continue with current plan of oxytocin. Will watch pt extremely closely. CATHY Hill RN.  6819- Pt up to bathroom and passed a clot the size of a baseball/softball. Denies increase in pain and bright red bleeding still moderate on the chux pad. Baby tolerating well at this time. Candice Hill RN  1315-Dr Chan placed epidural.  1430-Dr Haque called to come and assess pt. Uterine baseline increasing, FHT's non reassuring.  1440- Dr Haque here. IUPC replaced, and IFSE placed. O2 infusing via open face mask at 10L. Oxytocin is off. Discussing the probability of C/S.  1500-C/S called. Pt prep'd for surgery. To OR via bed. CATHY Hill RN

## 2017-08-31 NOTE — PROGRESS NOTES
Patient and baby transferred to 4th floor room 431. Report was given to Alanis Norman RN who will now assume care of this patient. Candice Hill RN

## 2017-08-31 NOTE — ANESTHESIA POSTPROCEDURE EVALUATION
Patient: Marycruz Aj    Procedure(s):   - Wound Class: II-Clean Contaminated    Diagnosis:pregnancy  Diagnosis Additional Information: No value filed.    Anesthesia Type:  Epidural    Note:  Anesthesia Post Evaluation    Patient location during evaluation: PACU  Patient participation: Able to fully participate in evaluation  Level of consciousness: awake  Pain management: adequate  Airway patency: patent  Cardiovascular status: acceptable  Respiratory status: acceptable  Hydration status: acceptable  PONV: none     Anesthetic complications: None          Last vitals:  Vitals:    08/31/17 1400 08/31/17 1415 08/31/17 1430   BP: 92/60 (!) 78/44 (!) 78/48   Pulse:      Resp:      Temp:      SpO2: 100% 100% 100%         Electronically Signed By: Dary Chan MD, MD  August 31, 2017  4:16 PM

## 2017-09-01 LAB — HGB BLD-MCNC: 9 G/DL (ref 11.7–15.7)

## 2017-09-01 PROCEDURE — 25000132 ZZH RX MED GY IP 250 OP 250 PS 637: Performed by: OBSTETRICS & GYNECOLOGY

## 2017-09-01 PROCEDURE — 85018 HEMOGLOBIN: CPT | Performed by: OBSTETRICS & GYNECOLOGY

## 2017-09-01 PROCEDURE — 12000037 ZZH R&B POSTPARTUM INTERMEDIATE

## 2017-09-01 PROCEDURE — 36415 COLL VENOUS BLD VENIPUNCTURE: CPT | Performed by: OBSTETRICS & GYNECOLOGY

## 2017-09-01 PROCEDURE — 25000128 H RX IP 250 OP 636: Performed by: OBSTETRICS & GYNECOLOGY

## 2017-09-01 RX ORDER — IBUPROFEN 400 MG/1
400-800 TABLET, FILM COATED ORAL EVERY 6 HOURS PRN
Status: DISCONTINUED | OUTPATIENT
Start: 2017-09-01 | End: 2017-09-03 | Stop reason: HOSPADM

## 2017-09-01 RX ADMIN — DIPHENHYDRAMINE HYDROCHLORIDE 25 MG: 25 CAPSULE ORAL at 00:57

## 2017-09-01 RX ADMIN — SODIUM CHLORIDE, SODIUM LACTATE, POTASSIUM CHLORIDE, CALCIUM CHLORIDE AND DEXTROSE MONOHYDRATE: 5; 600; 310; 30; 20 INJECTION, SOLUTION INTRAVENOUS at 01:54

## 2017-09-01 RX ADMIN — IBUPROFEN 800 MG: 400 TABLET ORAL at 16:18

## 2017-09-01 RX ADMIN — IBUPROFEN 800 MG: 400 TABLET ORAL at 22:18

## 2017-09-01 RX ADMIN — SENNOSIDES AND DOCUSATE SODIUM 1 TABLET: 8.6; 5 TABLET ORAL at 08:25

## 2017-09-01 RX ADMIN — OXYCODONE HYDROCHLORIDE 5 MG: 5 TABLET ORAL at 17:25

## 2017-09-01 RX ADMIN — OXYCODONE HYDROCHLORIDE 5 MG: 5 TABLET ORAL at 13:58

## 2017-09-01 RX ADMIN — SENNOSIDES AND DOCUSATE SODIUM 1 TABLET: 8.6; 5 TABLET ORAL at 22:18

## 2017-09-01 RX ADMIN — KETOROLAC TROMETHAMINE 30 MG: 30 INJECTION, SOLUTION INTRAMUSCULAR at 10:12

## 2017-09-01 RX ADMIN — ACETAMINOPHEN 975 MG: 325 TABLET, FILM COATED ORAL at 16:18

## 2017-09-01 RX ADMIN — KETOROLAC TROMETHAMINE 30 MG: 30 INJECTION, SOLUTION INTRAMUSCULAR at 04:04

## 2017-09-01 RX ADMIN — ACETAMINOPHEN 975 MG: 325 TABLET, FILM COATED ORAL at 08:25

## 2017-09-01 RX ADMIN — OXYCODONE HYDROCHLORIDE 5 MG: 5 TABLET ORAL at 18:23

## 2017-09-01 RX ADMIN — OXYCODONE HYDROCHLORIDE 5 MG: 5 TABLET ORAL at 22:19

## 2017-09-01 NOTE — PLAN OF CARE
Problem: Goal Outcome Summary  Goal: Goal Outcome Summary  Outcome: Improving  Patient arrive from PACU at 1800. VS stable. Incision with small amount serosanguinous drainage under Tegaderm which has not increased in volume. Patient states pain adequately managed with scheduled Toradol and will ask for PRN Dilaudid if desired. Fundus firm, midline with scant lochia. Orientation to room and unit reviewed with patient and . Patient safety reviewed with patient and . Patient verbalizes no questions at this time. Will Continue to monitor.

## 2017-09-01 NOTE — PLAN OF CARE
Problem: Goal Outcome Summary  Goal: Goal Outcome Summary  Outcome: No Change  VSS. C/o very minimal pain controlled with toradol. Incision intact with tegaderm, small amount of serosanguinous drainage noted. Good urine output in piña. Ambulated to bathroom. Instructed patient on perineal cares. Breastfeeding well. Working on  cares.

## 2017-09-01 NOTE — LACTATION NOTE
Initial visit. Mother has a 16 year old and 11 year old.  Mother states she feels this baby latched right on and breastfeeds well.    Breastfeeding handout given.   Advised to breastfeed exclusively, on demand, avoid pacifiers, bottles and formula unless medically indicated.  Encouraged rooming in, skin to skin, feeding on demand 8-12x/day or sooner if baby cues.  Explained benefits of holding and skin to skin.  Encouraged lots of skin to skin.   No further questions at this time.   Will follow as needed.   Yolis Pérez RNC, IBCLC

## 2017-09-01 NOTE — OP NOTE
DATE OF SERVICE:  08/31/2017      PREOPERATIVE DIAGNOSES:   1.  38 and 1/7 weeks intrauterine pregnancy.   2.  Suspected abruption.   3.  Foreboding fetal tracing.      POSTOPERATIVE DIAGNOSES:   1.  38 and 1/7 weeks intrauterine pregnancy.   2.  Suspected abruption.   3.  Foreboding fetal tracing.      PROCEDURE:  Primary low transverse caesarean section.      SURGEON:  Keely Haque MD      ASSISTANT:  None.      ANESTHETIC:  Epidural.      ESTIMATED BLOOD LOSS:  700 cc.      SPECIMENS:  Placenta, cord blood, and cord gas.      FINDINGS:  A male infant in SARWAT presentation, no evidence of nuchal cord.  Normal tubes and ovaries.  There was suspected peripheral abruption with black clot on the edge of the placenta and in the uterine cavity.      COMPLICATIONS:  None noted.      INDICATIONS:  Marycruz Aj is a 32-year-old, G4, P2-0-1-2, at 38 and 1/7 weeks, who was admitted last evening with a large loss of bright red vaginal bleeding.  She was monitored, and the bleeding did slow down.  Induction was started with Pitocin, and this morning she underwent assisted rupture of membranes showing sarath bloody fluid.  Fetal tracing looked reassuring with 130s-140s baseline, moderate baseline variability, accelerations, no decelerations.  She did receive epidural for pain management.  She had minimal cervical change and remained at 2.5 cm.  For that reason, an IUPC was placed to assess for adequate labor.  With placement of IUPC, after a large clot was released earlier in the morning, IUPC placement showed again a large gush of bloody fluid.  She was continued to be monitored, as there was reassuring fetal tracing.  However, midafternoon she began to have recurrent contractions every 1.5 minutes, and Pitocin was discontinued.  The baseline of the uterine tone did creep up with IUPC in place.  This was replaced x1, and made no change in the baseline.  Secondly, the baseline of the fetal heart rate tracing began to decrease to a  baseline of 105-110 with moderate baseline variability but in an undulating pattern.  With this, the irregularity of her contraction pressure, the raise of the baseline, and suspected abruption, being remote from delivery with the cervix at 4 cm, the decision was made to proceed with a primary low transverse caesarean section.  Risks, benefits and alternatives were discussed with the patient and her spouse, and they did sign informed consent.      PROCEDURE:  The patient was brought to the operating room where epidural was re-bolused, it was found to be adequate, pause for the cause was performed, and the patient and procedure were correctly identified.  A low transverse skin incision was made and was carried down to the underlying fascial layer, which was scored with Bovie electrocautery and stretched bilaterally.  The peritoneal cavity was bluntly entered.  Bladder blade was inserted and the hysterotomy was begun.  It was entered bluntly and finger fractured bilaterally.  Bloody fluid was noted, and the fetal vertex delivered atraumatically.  The cord was immediately cut and clamped, and the infant was brought over to the warmer.  The placenta delivered spontaneously and immediately let loose.  There was noted to be adherent black clot on the edge of the placenta, and large amount of blood clot was delivered out of the uterus.  The uterus was exteriorized and wrapped in a moist lap.  It was wiped of all placental and membrane fragments.  At that point, the hysterotomy was closed with a running locked suture of 0 Monocryl and a second horizontal imbricating suture of 0 Monocryl.  Two figure-of-X sutures were placed in each apex to maintain hemostasis.  At this point, the uterus was returned to the maternal abdomen, and the paracolic gutters were irrigated with moist lap.  All underlying tissue layers were made hemostatic, and there was no excessive bleeding.  The fascial layer was closed with a running suture of 0  Vicryl and found to be without palpable defect.  The subcutaneous tissue was irrigated with a moist lap, and the skin was closed with Insorb staples.  It was dressed with a Tegaderm dressing.  All counts were correct, and she was transferred to the PACU in stable condition.         KIMBERLY BAJWA MD             D: 2017 15:59   T: 2017 09:09   MT: [id number]      Name:     SARAH MEDEIROS   MRN:      [id number]        Account:        [id number]   :      1985           Procedure Date: 2017      Document: [id number]

## 2017-09-02 PROCEDURE — 12000037 ZZH R&B POSTPARTUM INTERMEDIATE

## 2017-09-02 PROCEDURE — 25000132 ZZH RX MED GY IP 250 OP 250 PS 637: Performed by: OBSTETRICS & GYNECOLOGY

## 2017-09-02 RX ORDER — CALCIUM CARBONATE 500 MG/1
500-1000 TABLET, CHEWABLE ORAL
Status: DISCONTINUED | OUTPATIENT
Start: 2017-09-02 | End: 2017-09-03 | Stop reason: HOSPADM

## 2017-09-02 RX ADMIN — CALCIUM CARBONATE (ANTACID) CHEW TAB 500 MG 1000 MG: 500 CHEW TAB at 23:30

## 2017-09-02 RX ADMIN — OXYCODONE HYDROCHLORIDE 5 MG: 5 TABLET ORAL at 21:53

## 2017-09-02 RX ADMIN — OXYCODONE HYDROCHLORIDE 5 MG: 5 TABLET ORAL at 04:30

## 2017-09-02 RX ADMIN — ACETAMINOPHEN 975 MG: 325 TABLET, FILM COATED ORAL at 16:35

## 2017-09-02 RX ADMIN — IBUPROFEN 800 MG: 400 TABLET ORAL at 10:33

## 2017-09-02 RX ADMIN — OXYCODONE HYDROCHLORIDE 5 MG: 5 TABLET ORAL at 08:05

## 2017-09-02 RX ADMIN — OXYCODONE HYDROCHLORIDE 5 MG: 5 TABLET ORAL at 14:34

## 2017-09-02 RX ADMIN — OXYCODONE HYDROCHLORIDE 5 MG: 5 TABLET ORAL at 21:47

## 2017-09-02 RX ADMIN — OXYCODONE HYDROCHLORIDE 5 MG: 5 TABLET ORAL at 11:08

## 2017-09-02 RX ADMIN — IBUPROFEN 800 MG: 400 TABLET ORAL at 16:35

## 2017-09-02 RX ADMIN — IBUPROFEN 800 MG: 400 TABLET ORAL at 21:47

## 2017-09-02 RX ADMIN — ACETAMINOPHEN 975 MG: 325 TABLET, FILM COATED ORAL at 01:30

## 2017-09-02 RX ADMIN — SENNOSIDES AND DOCUSATE SODIUM 1 TABLET: 8.6; 5 TABLET ORAL at 08:04

## 2017-09-02 RX ADMIN — ACETAMINOPHEN 975 MG: 325 TABLET, FILM COATED ORAL at 23:30

## 2017-09-02 RX ADMIN — ACETAMINOPHEN 975 MG: 325 TABLET, FILM COATED ORAL at 08:05

## 2017-09-02 RX ADMIN — OXYCODONE HYDROCHLORIDE 5 MG: 5 TABLET ORAL at 01:30

## 2017-09-02 RX ADMIN — IBUPROFEN 800 MG: 400 TABLET ORAL at 04:30

## 2017-09-02 RX ADMIN — OXYCODONE HYDROCHLORIDE 5 MG: 5 TABLET ORAL at 18:09

## 2017-09-02 NOTE — PLAN OF CARE
Problem: Goal Outcome Summary  Goal: Goal Outcome Summary  Outcome: No Change  Doing well,vss,voiding with out difficulty,pain control with tylenol,ibuprofen&oxycodone,baby breast feeding well.

## 2017-09-02 NOTE — PLAN OF CARE
Problem: Goal Outcome Summary  Goal: Goal Outcome Summary  Outcome: Improving  Vss, fundus firm with scant flow. Incision intact with Tegaderm, dried drainage noted. Pain managed with tylenol, ibuprofen and oxycodone. Wearing abdominal binder for comfort. Voiding well, tolerating regular diet. Breast feeding  well,  cluster feeding.

## 2017-09-02 NOTE — ANESTHESIA POSTPROCEDURE EVALUATION
Patient: Marycruz Aj    * No procedures listed *    Diagnosis:* No pre-op diagnosis entered *  Diagnosis Additional Information: No value filed.    Anesthesia Type:  No value filed.    Note:  Anesthesia Post Evaluation    Patient location during evaluation: Bedside  Patient participation: Able to fully participate in evaluation  Level of consciousness: awake and alert  Pain management: adequate  Airway patency: patent  Cardiovascular status: acceptable  Respiratory status: acceptable  Hydration status: acceptable  PONV: none     Anesthetic complications: None    Comments: S/p labor epidural        Last vitals:  Vitals:    09/01/17 1725 09/01/17 1823 09/01/17 1920   BP:      Pulse:      Resp: 16 16 16   Temp:      SpO2:            Electronically Signed By: Danny Hidalgo MD  September 1, 2017  9:37 PM

## 2017-09-02 NOTE — PLAN OF CARE
Problem: Goal Outcome Summary  Goal: Goal Outcome Summary  Outcome: Improving  VSS. Fundus firm with appropriate vaginal flow. Pain managed with scheduled Tyelnol, PRN ibuprofen & oxycodone. Up ambulating in room, encouraged ambulation in hallways. Voiding without difficulty. Incision intact with dermabond. Working on breastfeeding infant every 2-3 hours. Showered this evening. Independent with self and baby cares. Receiving good support from family at bedside. Will continue to monitor.

## 2017-09-02 NOTE — PROGRESS NOTES
POD 2    Doing well. Tolerating POs. Voiding without difficulty. Has had flatus.    VSS afebrile  HEENT nl  Abdomen soft and non tender  Fundus firm and non tender  Incision dry and intact  Tegaderm in place  Extremities nl     Assessment  POD 2                         Doing well.    Plan routine post op care          D/C home tomorrow

## 2017-09-02 NOTE — PLAN OF CARE
Problem: Goal Outcome Summary  Goal: Goal Outcome Summary  Outcome: Improving  VSS.  Breastfeeding going well.  Tylenol, Ibuprofen, and Oxycodone controlling pain.  FF @ midline.  Abdominal incision with Tegaderm.  Voiding adequately.  Will continue to monitor.

## 2017-09-02 NOTE — PLAN OF CARE
Vss, fundus firm with scant flow. Pain managed with Toradol and tylenol. Incision intact with Tegaderm. Palacio cathter removed. Has voided x 1. Tolerating regular diet. Breast feeding  well.

## 2017-09-03 VITALS
WEIGHT: 196 LBS | SYSTOLIC BLOOD PRESSURE: 115 MMHG | BODY MASS INDEX: 33.46 KG/M2 | DIASTOLIC BLOOD PRESSURE: 72 MMHG | OXYGEN SATURATION: 100 % | HEIGHT: 64 IN | HEART RATE: 70 BPM | RESPIRATION RATE: 16 BRPM | TEMPERATURE: 98.2 F

## 2017-09-03 PROCEDURE — 25000132 ZZH RX MED GY IP 250 OP 250 PS 637: Performed by: OBSTETRICS & GYNECOLOGY

## 2017-09-03 RX ORDER — OXYCODONE HYDROCHLORIDE 5 MG/1
5-10 TABLET ORAL
Qty: 30 TABLET | Refills: 0 | Status: SHIPPED | OUTPATIENT
Start: 2017-09-03

## 2017-09-03 RX ADMIN — IBUPROFEN 800 MG: 400 TABLET ORAL at 04:06

## 2017-09-03 RX ADMIN — OXYCODONE HYDROCHLORIDE 5 MG: 5 TABLET ORAL at 10:07

## 2017-09-03 RX ADMIN — OXYCODONE HYDROCHLORIDE 5 MG: 5 TABLET ORAL at 06:57

## 2017-09-03 RX ADMIN — OXYCODONE HYDROCHLORIDE 5 MG: 5 TABLET ORAL at 04:06

## 2017-09-03 RX ADMIN — ACETAMINOPHEN 975 MG: 325 TABLET, FILM COATED ORAL at 08:14

## 2017-09-03 RX ADMIN — IBUPROFEN 800 MG: 400 TABLET ORAL at 10:06

## 2017-09-03 RX ADMIN — OXYCODONE HYDROCHLORIDE 5 MG: 5 TABLET ORAL at 01:00

## 2017-09-03 RX ADMIN — OXYCODONE HYDROCHLORIDE 5 MG: 5 TABLET ORAL at 13:00

## 2017-09-03 NOTE — PLAN OF CARE
Problem: Goal Outcome Summary  Goal: Goal Outcome Summary  Outcome: Improving  VSS.  Breastfeeding going well.  Tylenol, Ibuprofen, and Oxycodone controlling pain.  FF @ midline.  Abdominal incision with Tegaderm.  Diarrhea improving.  Voiding adequately.  Will continue to monitor.

## 2017-09-03 NOTE — PLAN OF CARE
Problem: Goal Outcome Summary  Goal: Goal Outcome Summary  Outcome: Adequate for Discharge Date Met:  17  VSS, fundus firm with scant flow. Incision intact with Tegaderm. Pain managed with tylenol, ibuprofen and oxycodone. Wearing abdominal binder for comfort. Breast feeding  well, feels like milk is starting to come in. Prescription for oxycodone given to patient for home use. Instructions provided. Discharge instructions provided to patient, all questions answered at that time. Patient discharged with  via spouse.

## 2017-09-03 NOTE — DISCHARGE INSTRUCTIONS

## 2017-09-03 NOTE — PLAN OF CARE
Problem: Goal Outcome Summary  Goal: Goal Outcome Summary  Outcome: No Change  Vital signs stable. Voiding without difficulty.  Complaining of diarrhea, stool softener was held and B.R.A.T. diet encouraged. Will notify provider if no improvement. Lung sounds clear and equal. Using oxycodone/ibuprofen for pain management. Up ambulating free of dizziness. Has 2+ edema in her lower extremities. Working on breastfeeding every 2-3 hours. Encouraged to call with questions/concerns. Will continue to monitor.

## 2017-09-03 NOTE — PROGRESS NOTES
POD 3     Doing well.    vss afebrile    HEENT nl  Abdomen soft and nt  Fundus firm and nt  Incision dry and intact  Extremities +2 edema    Assessment POD 3 doing well    Plan  D/c home, prec reviewed, RTC 6 weeks

## 2017-09-05 LAB — COPATH REPORT: NORMAL

## 2017-10-18 NOTE — DISCHARGE SUMMARY
DATE OF ADMISSION:  2017.       DATE OF DISCHARGE:  3017.       HOSPITAL COURSE:  Sarah Aj was admitted at 38 and 1/7 weeks intrauterine pregnancy with concerning fetal tracing and a suspected abruption.  She had been laboring; however, during her labor there was non-reassuring fetal tracing with a decrease in baseline to 105-110 and an undulating baseline pattern.  She was remote from delivery at 4 cm and decision was made to proceed with primary low transverse  section for concern of possible abruption.  The patient underwent an uncomplicated primary low transverse  section.  Inspection of the placenta did show adherent clot the back of the placenta and bloody fluid.  By postoperative day #1, she was ambulating, tolerating oral pain medications and meeting routine milestones.  By postoperative day #3, the patient was meeting routine milestones and was given routine discharge teachings; she was to followup in the clinic 6 weeks postpartum.         KIMBERLY BAJWA MD             D: 10/18/2017 06:18   T: 10/18/2017 07:09   MT: [id number]      Name:     SARAH AJ   MRN:      [id number]        Account:        [id number]   :      1985           Admit Date:     [id number]                                  Discharge Date: 2017      Document: [id number]

## 2023-09-26 ENCOUNTER — LAB REQUISITION (OUTPATIENT)
Dept: LAB | Facility: CLINIC | Age: 38
End: 2023-09-26
Payer: COMMERCIAL

## 2023-09-26 DIAGNOSIS — N92.0 EXCESSIVE AND FREQUENT MENSTRUATION WITH REGULAR CYCLE: ICD-10-CM

## 2023-09-26 PROCEDURE — 88305 TISSUE EXAM BY PATHOLOGIST: CPT | Mod: TC,ORL | Performed by: OBSTETRICS & GYNECOLOGY

## 2023-09-27 LAB
PATH REPORT.COMMENTS IMP SPEC: NORMAL
PATH REPORT.COMMENTS IMP SPEC: NORMAL
PATH REPORT.FINAL DX SPEC: NORMAL
PATH REPORT.GROSS SPEC: NORMAL
PATH REPORT.MICROSCOPIC SPEC OTHER STN: NORMAL
PATH REPORT.RELEVANT HX SPEC: NORMAL
PHOTO IMAGE: NORMAL

## 2023-09-27 PROCEDURE — 88305 TISSUE EXAM BY PATHOLOGIST: CPT | Mod: 26 | Performed by: OBSTETRICS & GYNECOLOGY

## 2023-10-12 ENCOUNTER — PATIENT OUTREACH (OUTPATIENT)
Dept: CARE COORDINATION | Facility: CLINIC | Age: 38
End: 2023-10-12
Payer: COMMERCIAL

## 2023-10-13 ASSESSMENT — ACTIVITIES OF DAILY LIVING (ADL): DEPENDENT_IADLS:: INDEPENDENT

## 2023-11-08 ENCOUNTER — PATIENT OUTREACH (OUTPATIENT)
Dept: CARE COORDINATION | Facility: CLINIC | Age: 38
End: 2023-11-08
Payer: COMMERCIAL

## 2024-07-09 ENCOUNTER — LAB REQUISITION (OUTPATIENT)
Dept: LAB | Facility: CLINIC | Age: 39
End: 2024-07-09
Payer: COMMERCIAL

## 2024-07-09 DIAGNOSIS — N92.0 EXCESSIVE AND FREQUENT MENSTRUATION WITH REGULAR CYCLE: ICD-10-CM

## 2024-07-09 DIAGNOSIS — S37.69XA OTHER INJURY OF UTERUS, INITIAL ENCOUNTER: ICD-10-CM

## 2024-07-09 PROCEDURE — 88307 TISSUE EXAM BY PATHOLOGIST: CPT | Mod: TC,ORL | Performed by: OBSTETRICS & GYNECOLOGY

## 2024-07-10 PROCEDURE — 88307 TISSUE EXAM BY PATHOLOGIST: CPT | Mod: 26 | Performed by: PATHOLOGY

## (undated) DEVICE — PREP CHLORAPREP 26ML TINTED ORANGE  260815

## (undated) DEVICE — SU MONOCRYL 0 CTX 36" Y398H

## (undated) DEVICE — SU VICRYL 0 CT-1 27" J340H

## (undated) DEVICE — PACK C-SECTION LF PL15OTA83B

## (undated) DEVICE — LINEN C-SECTION 5415

## (undated) DEVICE — GLOVE PROTEXIS W/NEU-THERA 7.0  2D73TE70

## (undated) DEVICE — CATH TRAY FOLEY 16FR BARDEX W/DRAIN BAG STATLOCK 300316A

## (undated) DEVICE — DRSG TEGADERM 4X10" 1627

## (undated) DEVICE — BLADE CLIPPER 4406

## (undated) DEVICE — ESU GROUND PAD UNIVERSAL W/O CORD

## (undated) DEVICE — GLOVE PROTEXIS BLUE W/NEU-THERA 7.0  2D73EB70

## (undated) DEVICE — SUCTION CANISTER MEDIVAC LINER 3000ML W/LID 65651-530

## (undated) DEVICE — DRSG TEGADERM 2 1/2X 2 3/4"

## (undated) DEVICE — SOL NACL 0.9% IRRIG 1000ML BOTTLE 07138-09